# Patient Record
Sex: FEMALE | Race: BLACK OR AFRICAN AMERICAN | Employment: OTHER | URBAN - METROPOLITAN AREA
[De-identification: names, ages, dates, MRNs, and addresses within clinical notes are randomized per-mention and may not be internally consistent; named-entity substitution may affect disease eponyms.]

---

## 2024-02-28 ENCOUNTER — OFFICE VISIT (OUTPATIENT)
Age: 74
End: 2024-02-28
Payer: COMMERCIAL

## 2024-02-28 VITALS
DIASTOLIC BLOOD PRESSURE: 90 MMHG | OXYGEN SATURATION: 98 % | WEIGHT: 121.4 LBS | BODY MASS INDEX: 20.73 KG/M2 | SYSTOLIC BLOOD PRESSURE: 190 MMHG | HEIGHT: 64 IN | TEMPERATURE: 97.2 F | HEART RATE: 66 BPM | RESPIRATION RATE: 14 BRPM

## 2024-02-28 DIAGNOSIS — I11.0 HYPERTENSIVE HEART DISEASE WITH SYSTOLIC CONGESTIVE HEART FAILURE, UNSPECIFIED HF CHRONICITY (HCC): ICD-10-CM

## 2024-02-28 DIAGNOSIS — I50.20 HYPERTENSIVE HEART DISEASE WITH SYSTOLIC CONGESTIVE HEART FAILURE, UNSPECIFIED HF CHRONICITY (HCC): ICD-10-CM

## 2024-02-28 DIAGNOSIS — R94.39 ABNORMAL STRESS TEST: ICD-10-CM

## 2024-02-28 DIAGNOSIS — I25.5 ISCHEMIC CARDIOMYOPATHY: ICD-10-CM

## 2024-02-28 DIAGNOSIS — Z95.1 HX OF CABG: ICD-10-CM

## 2024-02-28 DIAGNOSIS — I25.708 CORONARY ARTERY DISEASE OF BYPASS GRAFT OF NATIVE HEART WITH STABLE ANGINA PECTORIS (HCC): Primary | ICD-10-CM

## 2024-02-28 PROCEDURE — 1123F ACP DISCUSS/DSCN MKR DOCD: CPT

## 2024-02-28 PROCEDURE — 99214 OFFICE O/P EST MOD 30 MIN: CPT

## 2024-02-28 RX ORDER — ASPIRIN 81 MG/1
81 TABLET, CHEWABLE ORAL DAILY
COMMUNITY

## 2024-02-28 RX ORDER — METOPROLOL SUCCINATE 200 MG/1
200 TABLET, EXTENDED RELEASE ORAL DAILY
COMMUNITY
Start: 2024-02-02

## 2024-02-28 RX ORDER — EMPAGLIFLOZIN 10 MG/1
10 TABLET, FILM COATED ORAL DAILY
COMMUNITY
Start: 2023-11-17

## 2024-02-28 RX ORDER — LOSARTAN POTASSIUM 100 MG/1
100 TABLET ORAL DAILY
Qty: 90 TABLET | Refills: 3 | Status: SHIPPED | OUTPATIENT
Start: 2024-02-28

## 2024-02-28 RX ORDER — ROSUVASTATIN CALCIUM 10 MG/1
1 TABLET, COATED ORAL
COMMUNITY
Start: 2023-07-20 | End: 2024-02-28 | Stop reason: DRUGHIGH

## 2024-02-28 RX ORDER — CLOPIDOGREL BISULFATE 75 MG/1
75 TABLET ORAL DAILY
COMMUNITY
Start: 2023-12-29

## 2024-02-28 RX ORDER — PHENOL 1.4 %
10 AEROSOL, SPRAY (ML) MUCOUS MEMBRANE DAILY
COMMUNITY
Start: 2024-02-20

## 2024-02-28 RX ORDER — ROSUVASTATIN CALCIUM 20 MG/1
20 TABLET, COATED ORAL DAILY
Qty: 90 TABLET | Refills: 3 | Status: SHIPPED | OUTPATIENT
Start: 2024-02-28

## 2024-02-28 RX ORDER — LATANOPROST 50 UG/ML
SOLUTION/ DROPS OPHTHALMIC
COMMUNITY
Start: 2024-02-23

## 2024-02-28 RX ORDER — LOSARTAN POTASSIUM 50 MG/1
25 TABLET ORAL DAILY
COMMUNITY
Start: 2024-02-20 | End: 2024-02-28 | Stop reason: DRUGHIGH

## 2024-02-28 RX ORDER — ISOSORBIDE MONONITRATE 60 MG/1
60 TABLET, EXTENDED RELEASE ORAL DAILY
COMMUNITY
Start: 2024-02-03

## 2024-02-28 ASSESSMENT — ENCOUNTER SYMPTOMS
COUGH: 0
VOMITING: 0
WHEEZING: 0
NAUSEA: 0
DIARRHEA: 0
SHORTNESS OF BREATH: 0
RHINORRHEA: 0
SORE THROAT: 0
ABDOMINAL PAIN: 0

## 2024-02-28 NOTE — PROGRESS NOTES
Jerrell Vega (:  1950) is a 73 y.o. female, with history significant for CAD status post CABG, prediabetes, hypertension, hyperlipidemia, renal artery stenosis, CKD who presents for hospital follow-up.    She was admitted to [facility] from 2024 to 2024 for an NSTEMI.  She presented with sudden onset chest tightness.  Her troponins elias up to the 150s.  She was managed with a heparin drip.  Given she had previously had a CABG 20 to 25 years prior and there was no record as to where they were, a cardiac CTA was obtained.  Impression as below.  She was started on nitrates and metoprolol 200 mg daily.  Plavix was restarted.  Plan was to obtain an outpatient echo given stress testing had showed a reduced EF of 38% and if she failed medical therapy for coronary artery disease, to reconsider cath with intervention.    Jerrell states that she has been doing well since being home from the hospital.  She has not had any recurrence of chest pain.  She denies chest pain, palpitations, shortness of breath, abdominal pain, nausea, vomiting, fevers/chills, PND, orthopnea, lower extremity edema, lightheadedness or dizziness, presyncope or syncope.  She reports that she does not check her blood pressure at home.  She states that she has been eating a lot of Ramen at home.    Relevant results  NST 2024  CONCLUSIONS     * Stress Single Photon Emission Computed Tomography (SPECT) tomographic   images reveal small size area of decreased activity in the mid anterior   segment(s), which is moderate in intensity and defect is reversible in rest   images.     * This is consistent with ischemia.     * This study is abnormal.     * This study is high risk due to ischemia and low EF.     * Transient ischemic dilatation present.     * Post stress images segmental wall thickening and motion are abnormal.     * Left ventricular function is moderately diminished  with calculated   ejection

## 2024-03-13 ENCOUNTER — OFFICE VISIT (OUTPATIENT)
Age: 74
End: 2024-03-13

## 2024-03-13 VITALS
RESPIRATION RATE: 14 BRPM | WEIGHT: 118.6 LBS | DIASTOLIC BLOOD PRESSURE: 87 MMHG | SYSTOLIC BLOOD PRESSURE: 143 MMHG | OXYGEN SATURATION: 96 % | BODY MASS INDEX: 20.25 KG/M2 | TEMPERATURE: 97 F | HEART RATE: 73 BPM | HEIGHT: 64 IN

## 2024-03-13 DIAGNOSIS — I25.810 CORONARY ARTERY DISEASE INVOLVING CORONARY BYPASS GRAFT OF NATIVE HEART WITHOUT ANGINA PECTORIS: ICD-10-CM

## 2024-03-13 DIAGNOSIS — I50.810 RVF (RIGHT VENTRICULAR FAILURE) (HCC): ICD-10-CM

## 2024-03-13 DIAGNOSIS — I70.1 RENAL ARTERY STENOSIS (HCC): ICD-10-CM

## 2024-03-13 DIAGNOSIS — I10 HYPERTENSION, UNSPECIFIED TYPE: ICD-10-CM

## 2024-03-13 DIAGNOSIS — I51.89 DIASTOLIC DYSFUNCTION: Primary | ICD-10-CM

## 2024-03-13 DIAGNOSIS — Z95.1 HX OF CABG: ICD-10-CM

## 2024-03-13 RX ORDER — ROSUVASTATIN CALCIUM 10 MG/1
10 TABLET, COATED ORAL DAILY
COMMUNITY
Start: 2024-02-28 | End: 2024-03-13

## 2024-03-13 ASSESSMENT — ENCOUNTER SYMPTOMS
DIARRHEA: 0
NAUSEA: 0
RHINORRHEA: 0
VOMITING: 0
COUGH: 0
SORE THROAT: 0
WHEEZING: 0
SHORTNESS OF BREATH: 0

## 2024-03-13 NOTE — PROGRESS NOTES
Jerrell Vega (:  1950) is a 73 y.o. female, with history significant for CAD status post CABG, prediabetes, hypertension, hyperlipidemia, renal artery stenosis, CKD who presents for follow-up of hypertension, ECHO results.    She was last seen in the office on 2024 for hospital follow-up where she was managed for an NSTEMI.  Her NST had suggested a low EF of 38% so a formal echo was ordered.  She also was noted to have significantly elevated blood pressure and a renal arterial ultrasound was ordered to assess renal artery stenosis.    Jerrell reports that she has been working on reducing her sodium intake.  She reports at home, her systolics have been improved and ranged from the low 100s systolic to low 140s systolic.  She denies any chest pain, palpitations, shortness of breath, abdominal pain, nausea, vomiting, fevers/chills, PND, orthopnea, dizziness or lightheadedness, presyncope or syncope, lower extremity edema.    Relevant results  ECHO 3/11/24    Image quality is fair.    Normal left ventricular systolic function. EF by 2D Simpsons Biplane is 65%. Mildly increased wall thickness. Grade II diastolic dysfunction with increased LAP.    Normal wall motion.    Reduced RV systolic function.    Bi-Atrial enlargement    Normal RVSP. The estimated RVSP is 29 mmHg.    Dilated ascending aorta.    Renal arterial doppler 3/11/24    Evidence suggest renal artery stenosis greater than or equal to 60%, in the left mid renal artery, where velocities peaked at 196 cm/s.  Spectral doppler revealed more than doubling of velocities in between the proximal and mid segments.    No evidence of renal artery stenosis greater than or equal to 60% in the right renal artery.    Bilateral, overall resistive indicies were within normal limits. Bilateral kidneys were symmetric in size and bilateral renal veins were patent with normal pulsatility and phasicity.    Incidenal finding of numerous anechoic, avascular

## 2024-03-13 NOTE — PATIENT INSTRUCTIONS
beans.  Where can you learn more?  Go to https://www.[employer].net/patientEd and enter H967 to learn more about \"DASH Diet: Care Instructions.\"  Current as of: September 20, 2023               Content Version: 14.0  © 6956-2105 [employer].   Care instructions adapted under license by [employer]. If you have questions about a medical condition or this instruction, always ask your healthcare professional. [employer], [employer] disclaims any warranty or liability for your use of this information.

## 2024-03-25 NOTE — TELEPHONE ENCOUNTER
PCP: Maria Elena Judge PA    Last appt: 9/15/2022 [place]  Future Appointments   Date Time Provider Department Center   5/28/2024  1:30 PM Ren Pate Sr., MD [facility]       Requested Prescriptions     Pending Prescriptions Disp Refills    metoprolol succinate (TOPROL XL) 200 MG extended release tablet 90 tablet 3     Sig: Take 1 tablet by mouth daily       Request for a 30 or 90 day supply? Provider Discretion    Pharmacy: Confirmed by phone msg    Other Comments: Nurse Aarti Bruce spoke with the Jerrell Vega and verified her medication, dosage, and pharmacy. Also verified medication and dosage by [facility] record from 1/29/2024.

## 2024-03-28 RX ORDER — METOPROLOL SUCCINATE 200 MG/1
200 TABLET, EXTENDED RELEASE ORAL DAILY
Qty: 90 TABLET | Refills: 3 | Status: SHIPPED | OUTPATIENT
Start: 2024-03-28

## 2024-05-28 ENCOUNTER — OFFICE VISIT (OUTPATIENT)
Age: 74
End: 2024-05-28
Payer: COMMERCIAL

## 2024-05-28 VITALS
OXYGEN SATURATION: 98 % | RESPIRATION RATE: 16 BRPM | SYSTOLIC BLOOD PRESSURE: 108 MMHG | WEIGHT: 119 LBS | BODY MASS INDEX: 20.32 KG/M2 | DIASTOLIC BLOOD PRESSURE: 52 MMHG | TEMPERATURE: 96.8 F | HEART RATE: 60 BPM | HEIGHT: 64 IN

## 2024-05-28 DIAGNOSIS — I50.32 CHRONIC DIASTOLIC (CONGESTIVE) HEART FAILURE (HCC): ICD-10-CM

## 2024-05-28 DIAGNOSIS — R07.89 OTHER CHEST PAIN: Primary | ICD-10-CM

## 2024-05-28 DIAGNOSIS — R01.1 SYSTOLIC MURMUR: ICD-10-CM

## 2024-05-28 DIAGNOSIS — R94.31 ABNORMAL ECG: ICD-10-CM

## 2024-05-28 DIAGNOSIS — I73.9 PVD (PERIPHERAL VASCULAR DISEASE) WITH CLAUDICATION (HCC): ICD-10-CM

## 2024-05-28 DIAGNOSIS — R06.02 EXERTIONAL SHORTNESS OF BREATH: ICD-10-CM

## 2024-05-28 DIAGNOSIS — E78.00 HYPERCHOLESTEREMIA: ICD-10-CM

## 2024-05-28 PROCEDURE — 99214 OFFICE O/P EST MOD 30 MIN: CPT | Performed by: INTERNAL MEDICINE

## 2024-05-28 PROCEDURE — 1123F ACP DISCUSS/DSCN MKR DOCD: CPT | Performed by: INTERNAL MEDICINE

## 2024-05-28 ASSESSMENT — ENCOUNTER SYMPTOMS
GASTROINTESTINAL NEGATIVE: 1
SHORTNESS OF BREATH: 1
EYES NEGATIVE: 1
ALLERGIC/IMMUNOLOGIC NEGATIVE: 1

## 2024-05-28 NOTE — PROGRESS NOTES
1. \"Have you been to the ER, urgent care clinic since your last visit?  Hospitalized since your last visit?\" Reviewed by Dr. Ren Pate    2. \"Have you seen or consulted any other health care providers outside of the [facility] since your last visit?\" Reviewed by Dr. Ren Pate

## 2024-05-28 NOTE — PROGRESS NOTES
Jerrell Vega (:  1950) is a 74 y.o. female,Established patient, here for evaluation of the following chief complaint(s):  3 Month Follow-Up    Subjective   SUBJECTIVE/OBJECTIVE:  HPI  Patient presents today for follow-up. She has a history of coronary artery disease and is status post bypass surgery. She has multiple medical problems to include prediabetes, hypertension, hyperlipidemia, and a history of renal artery stenosis.           Today, she is doing fair but has had some issues with chest discomfort; this is described as a right-sided sharp to dull sensation mid chest occurring at rest or with activity. It does not follow any particular pattern. She may have an episode and not have another episode for a week or more later. It lasts several minutes then dissipates. No accompanying shortness of breath. No palpitations. No syncope. No orthopnea.           I personally reviewed all available medical records, previous office notes, radiology reports and all available laboratory studies and procedural reports    Past Medical History:   Diagnosis Date    CAD (coronary artery disease)     CHF (congestive heart failure) (HCC)     Hyperlipidemia     Hypertension         Past Surgical History:   Procedure Laterality Date    CORONARY ARTERY BYPASS GRAFT      DIAGNOSTIC CARDIAC CATH LAB PROCEDURE          No Known Allergies     Current Outpatient Medications   Medication Sig Dispense Refill    metoprolol succinate (TOPROL XL) 200 MG extended release tablet Take 1 tablet by mouth daily 90 tablet 3    NIFEdipine (ADALAT CC) 60 MG extended release tablet Take 1 tablet by mouth daily      clopidogrel (PLAVIX) 75 MG tablet Take 1 tablet by mouth daily      aspirin 81 MG chewable tablet Take 1 tablet by mouth daily      JARDIANCE 10 MG tablet Take 1 tablet by mouth daily      isosorbide mononitrate (IMDUR) 60 MG extended release tablet Take 1 tablet by mouth daily      latanoprost (XALATAN) 0.005 % ophthalmic

## 2024-10-02 ENCOUNTER — TELEPHONE (OUTPATIENT)
Age: 74
End: 2024-10-02

## 2024-10-02 NOTE — TELEPHONE ENCOUNTER
Called and spoke with Jerrell Vega daughter, two pt identifiers verified, name and .     Informed her I was trying to reach her mother. She states,\"My mother is not available. Received a notification that her medication was ready. She went to the pharmacy to pick it up and it was told her medications were on hold. That's why she called yall.\"    Asked if they gave a reason, she states no.    Encouranged her to call her insurance company.    Jerrell cao voiced understanding.

## 2024-12-11 DIAGNOSIS — I50.32 CHRONIC DIASTOLIC HEART FAILURE (HCC): ICD-10-CM

## 2024-12-11 DIAGNOSIS — R01.1 SYSTOLIC MURMUR: ICD-10-CM

## 2024-12-11 DIAGNOSIS — R06.02 EXERTIONAL SHORTNESS OF BREATH: ICD-10-CM

## 2024-12-11 DIAGNOSIS — R07.89 OTHER CHEST PAIN: Primary | ICD-10-CM

## 2024-12-13 ENCOUNTER — TELEPHONE (OUTPATIENT)
Age: 74
End: 2024-12-13

## 2024-12-13 NOTE — TELEPHONE ENCOUNTER
I called Jerrell Veag to let her know that her insurance [employer] will not cover her echo appt on 12/17/24.  She had an echo done march 2024.  Her cardiac condition is considered low risk and she is on a once a year time table unless Dr. Pate would like to do a peer to peer to find it necessary. As of now her echo appt is cancelled. We will try again in March 2025.          Preferred call back number ; [phone]

## 2025-03-04 NOTE — TELEPHONE ENCOUNTER
PCP: Maria Elena Judge PA    Last appt:  5/28/2024   No future appointments.    Requested Prescriptions     Pending Prescriptions Disp Refills    losartan (COZAAR) 100 MG tablet 90 tablet 3     Sig: Take 1 tablet by mouth daily       Request for a 30 or 90 day supply? Provider Discretion    Pharmacy: Confirmed     Other Comments: Pt aware Appt is due 90 Day R0

## 2025-03-11 RX ORDER — LOSARTAN POTASSIUM 100 MG/1
100 TABLET ORAL DAILY
Qty: 90 TABLET | Refills: 3 | Status: SHIPPED | OUTPATIENT
Start: 2025-03-11

## 2025-04-02 NOTE — TELEPHONE ENCOUNTER
PCP: Maria Elena Judge PA    Last appt:  5/28/2024   No future appointments.    Requested Prescriptions     Pending Prescriptions Disp Refills    metoprolol succinate (TOPROL XL) 200 MG extended release tablet [Pharmacy Med Name: METOPROLOL SUCC  MG TAB] 90 tablet 3     Sig: take 1 tablet by mouth once daily       Request for a 30 or 90 day supply? Provider Discretion    Pharmacy: Confirmed     Other Comments: Appt needed. 30 Day Supply R0

## 2025-04-04 RX ORDER — METOPROLOL SUCCINATE 200 MG/1
200 TABLET, EXTENDED RELEASE ORAL DAILY
Qty: 90 TABLET | Refills: 0 | Status: SHIPPED | OUTPATIENT
Start: 2025-04-04

## 2025-07-17 ENCOUNTER — OFFICE VISIT (OUTPATIENT)
Age: 75
End: 2025-07-17
Payer: MEDICARE

## 2025-07-17 VITALS
WEIGHT: 119 LBS | OXYGEN SATURATION: 98 % | RESPIRATION RATE: 16 BRPM | BODY MASS INDEX: 20.32 KG/M2 | HEART RATE: 45 BPM | SYSTOLIC BLOOD PRESSURE: 92 MMHG | DIASTOLIC BLOOD PRESSURE: 58 MMHG | HEIGHT: 64 IN

## 2025-07-17 DIAGNOSIS — R94.31 ABNORMAL ECG: ICD-10-CM

## 2025-07-17 DIAGNOSIS — I50.32 CHRONIC DIASTOLIC (CONGESTIVE) HEART FAILURE (HCC): ICD-10-CM

## 2025-07-17 DIAGNOSIS — I73.9 PVD (PERIPHERAL VASCULAR DISEASE) WITH CLAUDICATION: ICD-10-CM

## 2025-07-17 DIAGNOSIS — I50.810 RVF (RIGHT VENTRICULAR FAILURE) (HCC): ICD-10-CM

## 2025-07-17 DIAGNOSIS — I25.708 CORONARY ARTERY DISEASE OF BYPASS GRAFT OF NATIVE HEART WITH STABLE ANGINA PECTORIS: ICD-10-CM

## 2025-07-17 DIAGNOSIS — R01.1 SYSTOLIC MURMUR: ICD-10-CM

## 2025-07-17 DIAGNOSIS — Z95.1 HX OF CABG: ICD-10-CM

## 2025-07-17 DIAGNOSIS — R07.89 OTHER CHEST PAIN: Primary | ICD-10-CM

## 2025-07-17 DIAGNOSIS — E78.00 HYPERCHOLESTEREMIA: ICD-10-CM

## 2025-07-17 DIAGNOSIS — I70.1 RENAL ARTERY STENOSIS: ICD-10-CM

## 2025-07-17 DIAGNOSIS — R06.02 EXERTIONAL SHORTNESS OF BREATH: ICD-10-CM

## 2025-07-17 DIAGNOSIS — I25.810 CORONARY ARTERY DISEASE INVOLVING CORONARY BYPASS GRAFT OF NATIVE HEART WITHOUT ANGINA PECTORIS: ICD-10-CM

## 2025-07-17 PROCEDURE — 99215 OFFICE O/P EST HI 40 MIN: CPT | Performed by: INTERNAL MEDICINE

## 2025-07-17 PROCEDURE — 1126F AMNT PAIN NOTED NONE PRSNT: CPT | Performed by: INTERNAL MEDICINE

## 2025-07-17 PROCEDURE — 1159F MED LIST DOCD IN RCRD: CPT | Performed by: INTERNAL MEDICINE

## 2025-07-17 PROCEDURE — 1036F TOBACCO NON-USER: CPT | Performed by: INTERNAL MEDICINE

## 2025-07-17 PROCEDURE — G8420 CALC BMI NORM PARAMETERS: HCPCS | Performed by: INTERNAL MEDICINE

## 2025-07-17 PROCEDURE — 1123F ACP DISCUSS/DSCN MKR DOCD: CPT | Performed by: INTERNAL MEDICINE

## 2025-07-17 PROCEDURE — G8400 PT W/DXA NO RESULTS DOC: HCPCS | Performed by: INTERNAL MEDICINE

## 2025-07-17 PROCEDURE — 93000 ELECTROCARDIOGRAM COMPLETE: CPT | Performed by: INTERNAL MEDICINE

## 2025-07-17 PROCEDURE — G8427 DOCREV CUR MEDS BY ELIG CLIN: HCPCS | Performed by: INTERNAL MEDICINE

## 2025-07-17 PROCEDURE — 1090F PRES/ABSN URINE INCON ASSESS: CPT | Performed by: INTERNAL MEDICINE

## 2025-07-17 RX ORDER — AMLODIPINE BESYLATE 5 MG/1
5 TABLET ORAL DAILY
COMMUNITY

## 2025-07-17 NOTE — PROGRESS NOTES
Jerrell Vega (:  1950) is a 75 y.o. female,Established patient, here for evaluation of the following chief complaint(s):  Follow-up      Subjective   SUBJECTIVE/OBJECTIVE:    History of Present Illness  Patient presents today for follow-up. She has a history of coronary artery disease and is status post bypass surgery. She has multiple medical problems to include prediabetes, hypertension, hyperlipidemia, and a history of renal artery stenosis.      She reports no recent episodes of chest discomfort or shortness of breath. She has been adhering to her prescribed medication regimen and occasionally monitors her blood pressure at home, although she admits it has been some time since the last check. She is not experiencing any nausea or stomach upset. She also mentions that her legs often feel fatigued after walking. She has not undergone the scheduled echocardiogram.    SOCIAL HISTORY  She admits to smoking marijuana.    FAMILY HISTORY  Her daughter had cancer.    I have carefully reviewed all available medical records, previous office notes, lab, x-ray and procedure reports    Past Medical History:   Diagnosis Date    CAD (coronary artery disease)     CHF (congestive heart failure) (HCC)     Hyperlipidemia     Hypertension         Past Surgical History:   Procedure Laterality Date    CORONARY ARTERY BYPASS GRAFT      DIAGNOSTIC CARDIAC CATH LAB PROCEDURE          No Known Allergies     Current Outpatient Medications   Medication Sig Dispense Refill    amLODIPine (NORVASC) 5 MG tablet Take 1 tablet by mouth daily      metoprolol succinate (TOPROL XL) 200 MG extended release tablet Take 1 tablet by mouth daily Appt needed 90 tablet 0    losartan (COZAAR) 100 MG tablet Take 1 tablet by mouth daily Appt Due 90 tablet 3    NIFEdipine (ADALAT CC) 60 MG extended release tablet Take 1 tablet by mouth daily      clopidogrel (PLAVIX) 75 MG tablet Take 1 tablet by mouth daily      aspirin 81 MG chewable tablet